# Patient Record
Sex: MALE | Race: WHITE | NOT HISPANIC OR LATINO | Employment: FULL TIME | URBAN - METROPOLITAN AREA
[De-identification: names, ages, dates, MRNs, and addresses within clinical notes are randomized per-mention and may not be internally consistent; named-entity substitution may affect disease eponyms.]

---

## 2025-06-11 ENCOUNTER — APPOINTMENT (OUTPATIENT)
Dept: RADIOLOGY | Facility: HOSPITAL | Age: 30
End: 2025-06-11

## 2025-06-11 ENCOUNTER — HOSPITAL ENCOUNTER (EMERGENCY)
Facility: HOSPITAL | Age: 30
Discharge: HOME | End: 2025-06-11

## 2025-06-11 VITALS
TEMPERATURE: 96.8 F | WEIGHT: 220 LBS | OXYGEN SATURATION: 98 % | RESPIRATION RATE: 17 BRPM | SYSTOLIC BLOOD PRESSURE: 151 MMHG | BODY MASS INDEX: 29.8 KG/M2 | DIASTOLIC BLOOD PRESSURE: 84 MMHG | HEIGHT: 72 IN | HEART RATE: 80 BPM

## 2025-06-11 DIAGNOSIS — S99.921A TOE INJURY, RIGHT, INITIAL ENCOUNTER: Primary | ICD-10-CM

## 2025-06-11 PROCEDURE — 99283 EMERGENCY DEPT VISIT LOW MDM: CPT

## 2025-06-11 PROCEDURE — 73660 X-RAY EXAM OF TOE(S): CPT | Mod: RT

## 2025-06-11 PROCEDURE — 2500000001 HC RX 250 WO HCPCS SELF ADMINISTERED DRUGS (ALT 637 FOR MEDICARE OP)

## 2025-06-11 PROCEDURE — 73660 X-RAY EXAM OF TOE(S): CPT | Mod: RIGHT SIDE | Performed by: RADIOLOGY

## 2025-06-11 RX ORDER — IBUPROFEN 600 MG/1
600 TABLET, FILM COATED ORAL ONCE
Status: COMPLETED | OUTPATIENT
Start: 2025-06-11 | End: 2025-06-11

## 2025-06-11 RX ADMIN — IBUPROFEN 600 MG: 600 TABLET ORAL at 20:45

## 2025-06-11 ASSESSMENT — LIFESTYLE VARIABLES
EVER FELT BAD OR GUILTY ABOUT YOUR DRINKING: NO
EVER HAD A DRINK FIRST THING IN THE MORNING TO STEADY YOUR NERVES TO GET RID OF A HANGOVER: NO
HAVE PEOPLE ANNOYED YOU BY CRITICIZING YOUR DRINKING: NO
HAVE YOU EVER FELT YOU SHOULD CUT DOWN ON YOUR DRINKING: NO
TOTAL SCORE: 0

## 2025-06-12 NOTE — DISCHARGE INSTRUCTIONS
Recommend rotating Tylenol and ibuprofen as needed at home for pain.  You should elevate and ice at least twice daily for 15 minutes at a time.  Advised to follow-up with your primary care physician within the next week regarding today's emergency department visit.  Return to the emergency department with any new or worsening symptoms including worsening pain, numbness or tingling, severe swelling, discoloration, coolness to the extremity, subsequent falls or injuries.

## 2025-06-13 NOTE — ED PROVIDER NOTES
HPI   Chief Complaint   Patient presents with    Toe Injury     2nd right toe injury when getting out of shower. This day.        29-year-old male presenting to the emergency department from home due to an injury to a toe injury.  Patient reports this evening while getting out of the shower he slipped and hurt his second toe on his right foot.  He cannot specify the exact mechanism of the injury as he states it happened so quickly.  Endorses swelling and bruising.  Denies any numbness or tingling.  He is able to ambulate but endorses increased pain with weightbearing.              Patient History   Medical History[1]  Surgical History[2]  Family History[3]  Social History[4]    Physical Exam   ED Triage Vitals [06/11/25 2022]   Temperature Heart Rate Respirations BP   36 °C (96.8 °F) 80 17 151/84      Pulse Ox Temp Source Heart Rate Source Patient Position   98 % Temporal -- Sitting      BP Location FiO2 (%)     Right arm --       Physical Exam  Vitals and nursing note reviewed.   Constitutional:       Appearance: Normal appearance.   HENT:      Head: Atraumatic.      Nose: Nose normal.      Mouth/Throat:      Mouth: Mucous membranes are moist.   Eyes:      Extraocular Movements: Extraocular movements intact.      Conjunctiva/sclera: Conjunctivae normal.   Cardiovascular:      Rate and Rhythm: Normal rate and regular rhythm.   Pulmonary:      Effort: Pulmonary effort is normal.      Breath sounds: Normal breath sounds.   Abdominal:      General: Abdomen is flat.      Palpations: Abdomen is soft.      Tenderness: There is no abdominal tenderness.   Musculoskeletal:      Cervical back: Neck supple.      Comments: Swelling and ecchymosis present to the second digit of the right foot.  Slight tenderness to palpation.  No obvious deformity.  Flexion and extension intact.  Sensation intact, pedal pulses 2+.   Skin:     General: Skin is warm and dry.      Capillary Refill: Capillary refill takes less than 2 seconds.    Neurological:      Mental Status: He is alert and oriented to person, place, and time.   Psychiatric:         Mood and Affect: Mood normal.           ED Course & MDM   Diagnoses as of 06/12/25 2109   Toe injury, right, initial encounter                 No data recorded     [place] Coma Scale Score: 15 (06/11/25 2025 : Roland Agee RN)                           Medical Decision Making  29-year-old male presenting to the emergency department from home due to an injury to a toe injury.  Vital signs reviewed and stable.  Patient is overall well-appearing and not in any acute distress.  There is swelling and ecchymosis present to the second digit of the right foot.  Slight tenderness to palpation.  No obvious deformity.  Flexion and extension intact.  RLE well-perfused and neurovascularly intact.  Patient is ambulatory while in the emergency department.  Ibuprofen administered for pain.  XR right toe without evidence of definite acute fracture, dislocation, or malalignment.  There is a slight irregularity of the second toe middle phalanx base which may represent a bone contusion.  Patient was informed of imaging findings, all questions and concerns were answered.  A postop shoe was provided to aid with weightbearing and ambulation.  Recommended elevation and icing at least twice daily for 15-minute intervals.  A referral was placed for the patient to establish and follow-up with a primary care physician.  We discussed strict return precautions to return to the emergency department with any new or worsening symptoms.  Patient was agreeable to plan of care and discharged in stable condition.        Procedure  Procedures       [1] History reviewed. No pertinent past medical history.  [2]   Past Surgical History:  Procedure Laterality Date    OTHER SURGICAL HISTORY  03/02/2021    No history of surgery   [3] No family history on file.  [4]   Social History  Tobacco Use    Smoking status: Not on file    Smokeless tobacco: Not  on file   Substance Use Topics    Alcohol use: Not on file    Drug use: Not on file        Jaci Paredes PA-C  06/12/25 [id number]